# Patient Record
Sex: FEMALE | Race: WHITE | Employment: UNEMPLOYED | URBAN - METROPOLITAN AREA
[De-identification: names, ages, dates, MRNs, and addresses within clinical notes are randomized per-mention and may not be internally consistent; named-entity substitution may affect disease eponyms.]

---

## 2017-12-13 ENCOUNTER — APPOINTMENT (OUTPATIENT)
Dept: GENERAL RADIOLOGY | Facility: CLINIC | Age: 57
End: 2017-12-13
Payer: COMMERCIAL

## 2017-12-13 ENCOUNTER — HOSPITAL ENCOUNTER (EMERGENCY)
Facility: CLINIC | Age: 57
Discharge: ANOTHER HOSPITAL | End: 2017-12-13
Attending: EMERGENCY MEDICINE
Payer: COMMERCIAL

## 2017-12-13 VITALS
SYSTOLIC BLOOD PRESSURE: 119 MMHG | BODY MASS INDEX: 34.99 KG/M2 | TEMPERATURE: 98.2 F | HEIGHT: 65 IN | HEART RATE: 96 BPM | DIASTOLIC BLOOD PRESSURE: 71 MMHG | OXYGEN SATURATION: 97 % | WEIGHT: 210 LBS | RESPIRATION RATE: 18 BRPM

## 2017-12-13 DIAGNOSIS — R07.9 CHEST PAIN, UNSPECIFIED TYPE: Primary | ICD-10-CM

## 2017-12-13 DIAGNOSIS — R94.31 EKG ABNORMALITIES: ICD-10-CM

## 2017-12-13 LAB
ABSOLUTE EOS #: 0.5 K/UL (ref 0–0.4)
ABSOLUTE IMMATURE GRANULOCYTE: ABNORMAL K/UL (ref 0–0.3)
ABSOLUTE LYMPH #: 0.9 K/UL (ref 1–4.8)
ABSOLUTE MONO #: 0.9 K/UL (ref 0.1–1.2)
ANION GAP SERPL CALCULATED.3IONS-SCNC: 14 MMOL/L (ref 9–17)
BASOPHILS # BLD: 1 % (ref 0–2)
BASOPHILS ABSOLUTE: 0.1 K/UL (ref 0–0.2)
BUN BLDV-MCNC: 21 MG/DL (ref 6–20)
BUN/CREAT BLD: ABNORMAL (ref 9–20)
CALCIUM SERPL-MCNC: 9.7 MG/DL (ref 8.6–10.4)
CHLORIDE BLD-SCNC: 98 MMOL/L (ref 98–107)
CO2: 27 MMOL/L (ref 20–31)
CREAT SERPL-MCNC: 0.7 MG/DL (ref 0.5–0.9)
DIFFERENTIAL TYPE: ABNORMAL
EOSINOPHILS RELATIVE PERCENT: 5 % (ref 1–4)
GFR AFRICAN AMERICAN: >60 ML/MIN
GFR NON-AFRICAN AMERICAN: >60 ML/MIN
GFR SERPL CREATININE-BSD FRML MDRD: ABNORMAL ML/MIN/{1.73_M2}
GFR SERPL CREATININE-BSD FRML MDRD: ABNORMAL ML/MIN/{1.73_M2}
GLUCOSE BLD-MCNC: 149 MG/DL (ref 70–99)
HCT VFR BLD CALC: 42.5 % (ref 36–46)
HEMOGLOBIN: 14.8 G/DL (ref 12–16)
IMMATURE GRANULOCYTES: ABNORMAL %
INR BLD: 1.1
LYMPHOCYTES # BLD: 10 % (ref 24–44)
MCH RBC QN AUTO: 30.9 PG (ref 26–34)
MCHC RBC AUTO-ENTMCNC: 34.8 G/DL (ref 31–37)
MCV RBC AUTO: 88.6 FL (ref 80–100)
MONOCYTES # BLD: 10 % (ref 2–11)
PARTIAL THROMBOPLASTIN TIME: 27 SEC (ref 21.3–31.3)
PDW BLD-RTO: 12.9 % (ref 12.5–15.4)
PLATELET # BLD: 323 K/UL (ref 140–450)
PLATELET ESTIMATE: ABNORMAL
PMV BLD AUTO: 8.6 FL (ref 6–12)
POTASSIUM SERPL-SCNC: 3.3 MMOL/L (ref 3.7–5.3)
PROTHROMBIN TIME: 10.7 SEC (ref 9.4–12.6)
RBC # BLD: 4.8 M/UL (ref 4–5.2)
RBC # BLD: ABNORMAL 10*6/UL
SEG NEUTROPHILS: 74 % (ref 36–66)
SEGMENTED NEUTROPHILS ABSOLUTE COUNT: 6.9 K/UL (ref 1.8–7.7)
SODIUM BLD-SCNC: 139 MMOL/L (ref 135–144)
TROPONIN INTERP: NORMAL
TROPONIN T: <0.03 NG/ML
WBC # BLD: 9.2 K/UL (ref 3.5–11)
WBC # BLD: ABNORMAL 10*3/UL

## 2017-12-13 PROCEDURE — 96376 TX/PRO/DX INJ SAME DRUG ADON: CPT

## 2017-12-13 PROCEDURE — 85610 PROTHROMBIN TIME: CPT

## 2017-12-13 PROCEDURE — 96374 THER/PROPH/DIAG INJ IV PUSH: CPT

## 2017-12-13 PROCEDURE — 85730 THROMBOPLASTIN TIME PARTIAL: CPT

## 2017-12-13 PROCEDURE — 96372 THER/PROPH/DIAG INJ SC/IM: CPT

## 2017-12-13 PROCEDURE — 71010 XR CHEST PORTABLE: CPT

## 2017-12-13 PROCEDURE — 99285 EMERGENCY DEPT VISIT HI MDM: CPT

## 2017-12-13 PROCEDURE — 85025 COMPLETE CBC W/AUTO DIFF WBC: CPT

## 2017-12-13 PROCEDURE — 6370000000 HC RX 637 (ALT 250 FOR IP): Performed by: EMERGENCY MEDICINE

## 2017-12-13 PROCEDURE — 84484 ASSAY OF TROPONIN QUANT: CPT

## 2017-12-13 PROCEDURE — 6360000002 HC RX W HCPCS: Performed by: EMERGENCY MEDICINE

## 2017-12-13 PROCEDURE — 93005 ELECTROCARDIOGRAM TRACING: CPT

## 2017-12-13 PROCEDURE — 80048 BASIC METABOLIC PNL TOTAL CA: CPT

## 2017-12-13 PROCEDURE — 36415 COLL VENOUS BLD VENIPUNCTURE: CPT

## 2017-12-13 RX ORDER — MORPHINE SULFATE 2 MG/ML
2 INJECTION, SOLUTION INTRAMUSCULAR; INTRAVENOUS ONCE
Status: COMPLETED | OUTPATIENT
Start: 2017-12-13 | End: 2017-12-13

## 2017-12-13 RX ORDER — ASPIRIN 81 MG/1
324 TABLET, CHEWABLE ORAL ONCE
Status: COMPLETED | OUTPATIENT
Start: 2017-12-13 | End: 2017-12-13

## 2017-12-13 RX ADMIN — ASPIRIN 81 MG 324 MG: 81 TABLET ORAL at 11:49

## 2017-12-13 RX ADMIN — MORPHINE SULFATE 2 MG: 2 INJECTION, SOLUTION INTRAMUSCULAR; INTRAVENOUS at 11:50

## 2017-12-13 RX ADMIN — ENOXAPARIN SODIUM 100 MG: 100 INJECTION SUBCUTANEOUS at 12:43

## 2017-12-13 RX ADMIN — MORPHINE SULFATE 2 MG: 2 INJECTION, SOLUTION INTRAMUSCULAR; INTRAVENOUS at 12:51

## 2017-12-13 ASSESSMENT — PAIN SCALES - GENERAL
PAINLEVEL_OUTOF10: 8
PAINLEVEL_OUTOF10: 0
PAINLEVEL_OUTOF10: 8
PAINLEVEL_OUTOF10: 5

## 2017-12-13 ASSESSMENT — PAIN DESCRIPTION - ORIENTATION: ORIENTATION: MID

## 2017-12-13 ASSESSMENT — PAIN DESCRIPTION - FREQUENCY: FREQUENCY: CONTINUOUS

## 2017-12-13 ASSESSMENT — PAIN DESCRIPTION - ONSET: ONSET: ON-GOING

## 2017-12-13 ASSESSMENT — PAIN DESCRIPTION - PROGRESSION: CLINICAL_PROGRESSION: NOT CHANGED

## 2017-12-13 ASSESSMENT — PAIN DESCRIPTION - PAIN TYPE: TYPE: ACUTE PAIN

## 2017-12-13 ASSESSMENT — PAIN DESCRIPTION - LOCATION: LOCATION: CHEST

## 2017-12-13 ASSESSMENT — PAIN DESCRIPTION - DESCRIPTORS: DESCRIPTORS: CONSTANT;PRESSURE

## 2017-12-13 NOTE — ED NOTES
kiley called for transport to [place]. ETA 30-40mins.     Cindy Goodman, BEATA  12/13/17 [id number] [facility], RN  12/13/17 7117

## 2017-12-13 NOTE — ED NOTES
Henna Wade MD spoke to [facility] and they accept the patient via Dr. Sher Sloan.      Elier Ortiz RN  12/13/17 8815

## 2017-12-13 NOTE — ED NOTES
Report to [facility] at [place]. Stressed ekg changes.      Brandi Pedroza, BEATA  12/13/17 [id number] Sasha Marlow, RN  12/13/17 8834

## 2017-12-13 NOTE — ED TRIAGE NOTES
Sitting at the cardiologist office felt short of breath, sweaty and felt nauseous about 30mins prior to this when started having mid sternal chest pain. Patient states cough for 3-4 days. Pain on arrival was 5/10 from 9/10.

## 2017-12-13 NOTE — ED PROVIDER NOTES
[facility]  [address] [address]  Phone: [phone]      Pt Name: Joy Loza  MRN: [id number]  [place] 1960  Date of evaluation: 12/13/2017      CHIEF COMPLAINT       Chief Complaint   Patient presents with    Chest Pain     sitting when chest pain started 30 mins ago at cardiologist office.  Cough     productive yellow cough. denies fever. started saturday. HISTORY OF PRESENT ILLNESS    59-year-old female presents to the emergency department today complaining of chest pain. Pain started approximately 30 minutes prior to arrival while she was sitting in the car at her mother's cardiologist office. She points to her epigastric area and describes the pain as a severe pressure. Pain did not radiate anywhere. Pain at its worst on a scale of 0-10 was a 9. Currently it is a 5 without any intervention. She does report associated nausea without vomiting. She reports associated dyspnea and diaphoresis. Never had problems like this in the past.  No lower extremity edema calf tenderness. No recent long car rides or plane trips anywhere. No history of DVT. She tells me over the past 3 days that she's had a productive cough with yellow sputum. She denies any fevers or chills. She thought that she simply has had a cold. No orthopnea or PND. REVIEW OF SYSTEMS     Review of Systems   All other systems reviewed and are negative. PAST MEDICAL HISTORY    has a past medical history of Baker's cyst of knee; History of low potassium; Hypertension goal BP (blood pressure) < 140/90; Hypothyroid; and Obesity, Class II, BMI 35.0-39.9, with comorbidity (see actual BMI). SURGICAL HISTORY      has no past surgical history on file.     CURRENT MEDICATIONS       Previous Medications    CITALOPRAM (CELEXA) 20 MG TABLET    TAKE 1 TABLET BY MOUTH ONE TIME A DAY     ELASTIC BANDAGES & SUPPORTS (POST-OP SHOE/SOFT TOP WOMEN) MISC    1 each by Does not apply route continuous LEVOTHYROXINE (SYNTHROID) 150 MCG TABLET    TAKE 1 TABLET BY MOUTH ONE TIME A DAY     POTASSIUM CHLORIDE (KLOR-CON M) 20 MEQ EXTENDED RELEASE TABLET    TAKE 1 TABLET BY MOUTH ONE TIME A DAY     TOLTERODINE (DETROL LA) 4 MG EXTENDED RELEASE CAPSULE    TAKE 1 CAPSULE BY MOUTH ONE TIME A DAY     TRIAMTERENE-HYDROCHLOROTHIAZIDE (MAXZIDE) 75-50 MG PER TABLET    TAKE 1 TABLET BY MOUTH ONE TIME A DAY        ALLERGIES     has No Known Allergies. FAMILY HISTORY     indicated that the status of her father is unknown. She indicated that the status of her brother is unknown.      family history includes Dementia in her father; Dementia (age of onset: 61) in her brother. SOCIAL HISTORY      reports that she has never smoked. She has never used smokeless tobacco. She reports that she drinks alcohol. She reports that she does not use drugs. PHYSICAL EXAM     INITIAL VITALS:  height is 5' 5\" (1.651 m) and weight is 95.3 kg (210 lb). Her oral temperature is 98.6 °F (37 °C). Her blood pressure is 142/98 (abnormal) and her pulse is 92. Her respiration is 11 and oxygen saturation is 96%. Physical Exam   Constitutional: She is oriented to person, place, and time. She appears well-developed and well-nourished. No distress. HENT:   Head: Normocephalic and atraumatic. Mouth/Throat: Oropharynx is clear and moist.   Eyes: Conjunctivae and EOM are normal. Pupils are equal, round, and reactive to light. Neck: Normal range of motion. Neck supple. No tracheal deviation present. Cardiovascular: Normal rate, regular rhythm and intact distal pulses. Pulmonary/Chest: Effort normal and breath sounds normal. No respiratory distress. Abdominal: Soft. Bowel sounds are normal. She exhibits no distension. There is no tenderness. Musculoskeletal: Normal range of motion. She exhibits no edema or tenderness. Neurological: She is alert and oriented to person, place, and time. No cranial nerve deficit.  Coordination normal.   Skin: 80 - 100 fL    MCH 30.9 26 - 34 pg    MCHC 34.8 31 - 37 g/dL    RDW 12.9 12.5 - 15.4 %    Platelets 763 833 - 674 k/uL    MPV 8.6 6.0 - 12.0 fL    Differential Type NOT REPORTED     Seg Neutrophils 74 (H) 36 - 66 %    Lymphocytes 10 (L) 24 - 44 %    Monocytes 10 2 - 11 %    Eosinophils % 5 (H) 1 - 4 %    Basophils 1 0 - 2 %    Immature Granulocytes NOT REPORTED 0 %    Segs Absolute 6.90 1.8 - 7.7 k/uL    Absolute Lymph # 0.90 (L) 1.0 - 4.8 k/uL    Absolute Mono # 0.90 0.1 - 1.2 k/uL    Absolute Eos # 0.50 (H) 0.0 - 0.4 k/uL    Basophils # 0.10 0.0 - 0.2 k/uL    Absolute Immature Granulocyte NOT REPORTED 0.00 - 0.30 k/uL    WBC Morphology NOT REPORTED     RBC Morphology NOT REPORTED     Platelet Estimate NOT REPORTED    Basic Metabolic Panel   Result Value Ref Range    Glucose 149 (H) 70 - 99 mg/dL    BUN 21 (H) 6 - 20 mg/dL    CREATININE 0.70 0.50 - 0.90 mg/dL    Bun/Cre Ratio NOT REPORTED 9 - 20    Calcium 9.7 8.6 - 10.4 mg/dL    Sodium 139 135 - 144 mmol/L    Potassium 3.3 (L) 3.7 - 5.3 mmol/L    Chloride 98 98 - 107 mmol/L    CO2 27 20 - 31 mmol/L    Anion Gap 14 9 - 17 mmol/L    GFR Non-African American >60 >60 mL/min    GFR African American >60 >60 mL/min    GFR Comment          GFR Staging NOT REPORTED    Troponin   Result Value Ref Range    Troponin T <0.03 <0.03 ng/mL    Troponin Interp         APTT   Result Value Ref Range    PTT 27.0 21.3 - 31.3 sec   Protime-INR   Result Value Ref Range    Protime 10.7 9.4 - 12.6 sec    INR 1.1        EMERGENCY DEPARTMENT COURSE:     The patient was given the following medications:  Orders Placed This Encounter   Medications    aspirin chewable tablet 324 mg    morphine (PF) injection 2 mg    enoxaparin (LOVENOX) injection 100 mg    morphine (PF) injection 2 mg        Vitals:    Vitals:    12/13/17 1235 12/13/17 1237 12/13/17 1304 12/13/17 1319   BP: (!) 130/95 (!) 136/90 (!) 157/101 (!) 142/98   Pulse: 90 84 94 92   Resp: 15 13 16 11   [facility]       [facility]: 95% 95% 98% 96%   Weight:       Height:         -------------------------  BP: (!) 142/98, Temp: 98.6 °F (37 °C), Pulse: 92, Resp: 11      Re-evaluation Notes  Patient was called out stating that the chest pain is returned. It was gone after the 2 mg of morphine. It returned at an 8. Repeat EKG as  by myself as being unchanged compared to the previous. Chest pain now on reevaluation at approximately 1230 hrs., she denies symptoms. At this point have a page out to transferred to [facility] as this is her family's hospital of choice. Whenever she's been admitted she's always been admitted to that facility. 1:19 PM  [facility] Accepting physician is Dr. Sherrod Primrose.  Patient will be transferred. CONSULTS:  None    CRITICAL CARE:   None    PROCEDURES:  None    FINAL IMPRESSION      1. Chest pain, unspecified type    2. EKG abnormalities          DISPOSITION/PLAN   DISPOSITION Decision to Transfer    Condition on Disposition  fair    PATIENT REFERRED TO:  No follow-up provider specified.     DISCHARGE MEDICATIONS:  New Prescriptions    No medications on file       (Please note that portions of this note were completed with a voice recognition program.  Efforts were made to edit the dictations but occasionally words are mis-transcribed.)    Magdalena Arriaga MD, F.A.C.E.P, F.A.A.E.M  Emergency Physician Attending          Magdalena Arriaga MD  12/13/17 2894 Betsy Acuna MD  12/13/17 9228

## 2017-12-13 NOTE — ED NOTES
Called into the room. Patient is having upper epigastric pain she is showing sinus rhythm on the monitor at [facility] and 141/88 BP with 98% room air.      Karla Varma RN  12/13/17 0216

## 2017-12-14 LAB
EKG ATRIAL RATE: 85 BPM
EKG ATRIAL RATE: 89 BPM
EKG P AXIS: 28 DEGREES
EKG P AXIS: 31 DEGREES
EKG P-R INTERVAL: 142 MS
EKG P-R INTERVAL: 144 MS
EKG Q-T INTERVAL: 400 MS
EKG Q-T INTERVAL: 400 MS
EKG QRS DURATION: 94 MS
EKG QRS DURATION: 96 MS
EKG QTC CALCULATION (BAZETT): 476 MS
EKG QTC CALCULATION (BAZETT): 486 MS
EKG R AXIS: -32 DEGREES
EKG R AXIS: -45 DEGREES
EKG T AXIS: 125 DEGREES
EKG T AXIS: 161 DEGREES
EKG VENTRICULAR RATE: 85 BPM
EKG VENTRICULAR RATE: 89 BPM

## 2018-01-16 PROBLEM — Z71.3 WEIGHT LOSS COUNSELING, ENCOUNTER FOR: Status: ACTIVE | Noted: 2018-01-16

## 2019-09-25 ENCOUNTER — HOSPITAL ENCOUNTER (OUTPATIENT)
Dept: WOMENS IMAGING | Age: 59
Discharge: HOME | End: 2019-09-27
Payer: COMMERCIAL

## 2019-09-25 DIAGNOSIS — Z12.31 SCREENING MAMMOGRAM, ENCOUNTER FOR: ICD-10-CM

## 2019-09-25 PROCEDURE — 77063 BREAST TOMOSYNTHESIS BI: CPT

## 2020-11-11 ENCOUNTER — HOSPITAL ENCOUNTER (OUTPATIENT)
Dept: WOMENS IMAGING | Age: 60
Discharge: HOME | End: 2020-11-13
Payer: COMMERCIAL

## 2020-11-11 PROCEDURE — 77063 BREAST TOMOSYNTHESIS BI: CPT

## 2021-09-13 ENCOUNTER — TELEPHONE (OUTPATIENT)
Dept: GASTROENTEROLOGY | Age: 61
End: 2021-09-13

## 2021-09-13 DIAGNOSIS — Z90.49 S/P CHOLECYSTECTOMY: Primary | ICD-10-CM

## 2021-09-13 NOTE — TELEPHONE ENCOUNTER
Todd from [facility] called he states Dr Mayda Lewis removed patient gallbladder today and wants Dr Sofía Dunbar to do a ERCP within the next month .  Please call patient and schedule thank you

## 2021-09-16 NOTE — TELEPHONE ENCOUNTER
Writer spoke to pt over the phone in regards to scheduling ERCP. Writer explained procedure and informed pt what they are looking for. Per prev msg, Dr Arely Tate would like ERCP within the next month. Pt wants to know how long should she wait after her surgery to have ERCP. Writer informed pt I do not know that answer and will have to speak w/ Dr Ceballos Last about how long after surgery should be ERCP. Writer informed pt I will speak w/ Dr Tucker Cedeño this afternoon and call her back. Pt voices her understanding.

## 2021-09-16 NOTE — TELEPHONE ENCOUNTER
Dr Johnna Guadalupe says to do proc in 1-2 wks.  Please call pt and let her know time frame and also sched proc

## 2021-09-16 NOTE — TELEPHONE ENCOUNTER
Writer placed orders per Dr Christina Petersen verbal request. Bilateral prepectoral tissue expander breast reconstruction, filled to 100cc each Bilateral immediate prepectoral tissue expander breast reconstruction, filled to 100cc each, with alloderm  Please see Dr. Macias's note for bilateral mastectomy Bilateral immediate prepectoral tissue expander breast reconstruction, filled to 100cc each, with alloderm incisional reinforcement  Please see Dr. Macias's note for bilateral mastectomy

## 2021-12-20 ENCOUNTER — HOSPITAL ENCOUNTER (OUTPATIENT)
Dept: WOMENS IMAGING | Age: 61
Discharge: HOME | End: 2021-12-22
Payer: COMMERCIAL

## 2021-12-20 DIAGNOSIS — Z12.31 ENCOUNTER FOR SCREENING MAMMOGRAM FOR MALIGNANT NEOPLASM OF BREAST: ICD-10-CM

## 2021-12-20 PROCEDURE — 77063 BREAST TOMOSYNTHESIS BI: CPT

## 2023-01-18 ENCOUNTER — HOSPITAL ENCOUNTER (OUTPATIENT)
Dept: WOMENS IMAGING | Age: 63
Discharge: HOME | End: 2023-01-20
Payer: COMMERCIAL

## 2023-01-18 DIAGNOSIS — Z12.31 VISIT FOR SCREENING MAMMOGRAM: ICD-10-CM

## 2023-01-18 PROCEDURE — 77063 BREAST TOMOSYNTHESIS BI: CPT

## 2024-01-26 ENCOUNTER — HOSPITAL ENCOUNTER (OUTPATIENT)
Dept: WOMENS IMAGING | Age: 64
Discharge: HOME | End: 2024-01-26
Payer: COMMERCIAL

## 2024-01-26 DIAGNOSIS — Z12.31 ENCOUNTER FOR SCREENING MAMMOGRAM FOR BREAST CANCER: ICD-10-CM

## 2024-01-26 PROCEDURE — 77063 BREAST TOMOSYNTHESIS BI: CPT

## 2025-01-27 ENCOUNTER — HOSPITAL ENCOUNTER (OUTPATIENT)
Dept: WOMENS IMAGING | Age: 65
Discharge: HOME | End: 2025-01-29
Payer: COMMERCIAL

## 2025-01-27 VITALS — WEIGHT: 168 LBS | HEIGHT: 64 IN | BODY MASS INDEX: 28.68 KG/M2

## 2025-01-27 DIAGNOSIS — Z12.31 VISIT FOR SCREENING MAMMOGRAM: ICD-10-CM

## 2025-01-27 PROCEDURE — 77063 BREAST TOMOSYNTHESIS BI: CPT
